# Patient Record
Sex: FEMALE | Race: BLACK OR AFRICAN AMERICAN
[De-identification: names, ages, dates, MRNs, and addresses within clinical notes are randomized per-mention and may not be internally consistent; named-entity substitution may affect disease eponyms.]

---

## 2018-03-06 ENCOUNTER — HOSPITAL ENCOUNTER (EMERGENCY)
Dept: HOSPITAL 61 - ER | Age: 58
Discharge: HOME | End: 2018-03-06
Payer: COMMERCIAL

## 2018-03-06 DIAGNOSIS — L02.415: Primary | ICD-10-CM

## 2018-03-06 DIAGNOSIS — Z98.1: ICD-10-CM

## 2018-03-06 DIAGNOSIS — M19.90: ICD-10-CM

## 2018-03-06 DIAGNOSIS — Z90.710: ICD-10-CM

## 2018-03-06 DIAGNOSIS — Z88.1: ICD-10-CM

## 2018-03-06 DIAGNOSIS — Z88.2: ICD-10-CM

## 2018-03-06 PROCEDURE — 99283 EMERGENCY DEPT VISIT LOW MDM: CPT

## 2018-03-06 PROCEDURE — 10060 I&D ABSCESS SIMPLE/SINGLE: CPT

## 2018-03-06 RX ADMIN — LIDOCAINE HYDROCHLORIDE,EPINEPHRINE BITARTRATE 1 ML: 20; .01 INJECTION, SOLUTION INFILTRATION; PERINEURAL at 12:48

## 2018-03-06 RX ADMIN — HYDROCODONE BITARTRATE AND ACETAMINOPHEN 1 TAB: 5; 325 TABLET ORAL at 12:47

## 2018-03-06 RX ADMIN — HYDROCODONE BITARTRATE AND ACETAMINOPHEN 1 TAB: 5; 325 TABLET ORAL at 12:50

## 2019-06-04 ENCOUNTER — HOSPITAL ENCOUNTER (OUTPATIENT)
Dept: HOSPITAL 61 - RAD | Age: 59
Discharge: HOME | End: 2019-06-04
Attending: PSYCHIATRY & NEUROLOGY
Payer: COMMERCIAL

## 2019-06-04 VITALS — DIASTOLIC BLOOD PRESSURE: 52 MMHG | SYSTOLIC BLOOD PRESSURE: 106 MMHG

## 2019-06-04 VITALS — DIASTOLIC BLOOD PRESSURE: 58 MMHG | SYSTOLIC BLOOD PRESSURE: 111 MMHG

## 2019-06-04 DIAGNOSIS — R51: Primary | ICD-10-CM

## 2019-06-04 DIAGNOSIS — G93.2: ICD-10-CM

## 2019-06-04 LAB
CLARITY CSF: CLEAR
COLOR CSF: COLORLESS
CSF RBC COUNT: 822 /CMM
CSF WBC COUNT: 1 /CMM
GLUCOSE CSF-MCNC: 59 MG/DL (ref 37–70)
PROT CSF-MCNC: 29.7 MG/DL (ref 15–45)

## 2019-06-04 PROCEDURE — 62270 DX LMBR SPI PNXR: CPT

## 2019-06-04 PROCEDURE — 89051 BODY FLUID CELL COUNT: CPT

## 2019-06-04 PROCEDURE — 82945 GLUCOSE OTHER FLUID: CPT

## 2019-06-04 PROCEDURE — 84157 ASSAY OF PROTEIN OTHER: CPT

## 2019-06-04 NOTE — RAD
EXAM: FLUOROSCOPICALLY GUIDED LUMBAR PUNCTURE.

 

HISTORY: Intracranial hypertension, headache. Fluoroscopically guided 

lumbar puncture is requested.

 

FINDINGS: The procedure along with its risks and benefits were explained 

to the patient. They agreed to proceed. A timeout procedure was performed.

 

The patient was placed prone on the fluoroscopy table. The L3-4 level was 

localized fluoroscopically. The overlying skin was sterilely prepped and 

infiltrated with 1% lidocaine for local anesthesia. Under fluoroscopic 

guidance, a 20-gauge spinal needle was advanced into the thecal sac. A 

fluoroscopic image was obtained. Fluoroscopy time 0.2 minutes. There was 

spontaneous return of clear cerebrospinal fluid.  12 mL were collected. 

Instrumentation was withdrawn and a sterile dressing placed. There were no

immediate complications. Postprocedural instructions were provided.

 

Opening pressure was 22 cm water. Closing pressure was 15 cm water.

 

IMPRESSION:

1. Successful fluoroscopically guided lumbar puncture.

 

Electronically signed by: RADHA Brown MD (6/4/2019 4:11 PM) [facility]

## 2019-06-04 NOTE — NUR
Care After Lumbar Puncture instructions reviewed with patient and she verbalized 
understanding.  Vital signs stable and patient stated she felt like she could walk out to 
her car.  Spouse accompanied patient out of CV/OBS area to take home.